# Patient Record
Sex: MALE | Race: WHITE | Employment: FULL TIME | URBAN - METROPOLITAN AREA
[De-identification: names, ages, dates, MRNs, and addresses within clinical notes are randomized per-mention and may not be internally consistent; named-entity substitution may affect disease eponyms.]

---

## 2017-04-01 DIAGNOSIS — K64.5 THROMBOSED EXTERNAL HEMORRHOID: ICD-10-CM

## 2017-04-01 DIAGNOSIS — E78.2 MIXED HYPERLIPIDEMIA: Primary | ICD-10-CM

## 2017-04-01 DIAGNOSIS — B35.3 TINEA PEDIS, UNSPECIFIED LATERALITY: ICD-10-CM

## 2017-04-01 DIAGNOSIS — E03.9 ACQUIRED HYPOTHYROIDISM: ICD-10-CM

## 2017-04-01 RX ORDER — LIDOCAINE HYDROCHLORIDE AND HYDROCORTISONE ACETATE 30; 5 MG/G; MG/G
CREAM RECTAL
Qty: 6 KIT | Refills: 2 | Status: CANCELLED | OUTPATIENT
Start: 2017-04-01

## 2017-04-02 RX ORDER — LEVOTHYROXINE SODIUM 300 UG/1
TABLET ORAL
Qty: 90 TABLET | Refills: 0 | Status: SHIPPED | OUTPATIENT
Start: 2017-04-02 | End: 2017-09-13 | Stop reason: SDUPTHER

## 2017-09-08 DIAGNOSIS — E03.9 ACQUIRED HYPOTHYROIDISM: ICD-10-CM

## 2017-09-08 RX ORDER — LEVOTHYROXINE SODIUM 300 UG/1
TABLET ORAL
Qty: 90 TABLET | Refills: 3 | OUTPATIENT
Start: 2017-09-08

## 2017-09-13 ENCOUNTER — TELEPHONE (OUTPATIENT)
Dept: FAMILY MEDICINE CLINIC | Age: 62
End: 2017-09-13

## 2017-09-13 DIAGNOSIS — E78.2 MIXED HYPERLIPIDEMIA: ICD-10-CM

## 2017-09-13 DIAGNOSIS — E03.9 ACQUIRED HYPOTHYROIDISM: Primary | ICD-10-CM

## 2017-09-14 RX ORDER — LEVOTHYROXINE SODIUM 300 UG/1
TABLET ORAL
Qty: 30 TABLET | Refills: 0 | Status: SHIPPED | OUTPATIENT
Start: 2017-09-14 | End: 2017-10-04 | Stop reason: SDUPTHER

## 2017-09-16 DIAGNOSIS — E03.9 ACQUIRED HYPOTHYROIDISM: ICD-10-CM

## 2017-09-16 DIAGNOSIS — E78.2 MIXED HYPERLIPIDEMIA: ICD-10-CM

## 2017-09-16 LAB
ALBUMIN SERPL-MCNC: 4 G/DL (ref 3.9–4.9)
ALP BLD-CCNC: 78 U/L (ref 35–104)
ALT SERPL-CCNC: 38 U/L (ref 0–41)
ANION GAP SERPL CALCULATED.3IONS-SCNC: 18 MEQ/L (ref 7–13)
AST SERPL-CCNC: 33 U/L (ref 0–40)
BASOPHILS ABSOLUTE: 0 K/UL (ref 0–0.2)
BASOPHILS RELATIVE PERCENT: 0.6 %
BILIRUB SERPL-MCNC: 0.3 MG/DL (ref 0–1.2)
BUN BLDV-MCNC: 16 MG/DL (ref 8–23)
CALCIUM SERPL-MCNC: 9.2 MG/DL (ref 8.6–10.2)
CHLORIDE BLD-SCNC: 104 MEQ/L (ref 98–107)
CHOLESTEROL, TOTAL: 192 MG/DL (ref 0–199)
CO2: 23 MEQ/L (ref 22–29)
CREAT SERPL-MCNC: 0.86 MG/DL (ref 0.7–1.2)
EOSINOPHILS ABSOLUTE: 0.1 K/UL (ref 0–0.7)
EOSINOPHILS RELATIVE PERCENT: 1.7 %
GFR AFRICAN AMERICAN: >60
GFR NON-AFRICAN AMERICAN: >60
GLOBULIN: 2.6 G/DL (ref 2.3–3.5)
GLUCOSE BLD-MCNC: 79 MG/DL (ref 74–109)
HCT VFR BLD CALC: 46.4 % (ref 42–52)
HDLC SERPL-MCNC: 89 MG/DL (ref 40–59)
HEMOGLOBIN: 15.4 G/DL (ref 14–18)
LDL CHOLESTEROL CALCULATED: 86 MG/DL (ref 0–129)
LYMPHOCYTES ABSOLUTE: 2.3 K/UL (ref 1–4.8)
LYMPHOCYTES RELATIVE PERCENT: 34.1 %
MCH RBC QN AUTO: 32.4 PG (ref 27–31.3)
MCHC RBC AUTO-ENTMCNC: 33.2 % (ref 33–37)
MCV RBC AUTO: 97.4 FL (ref 80–100)
MONOCYTES ABSOLUTE: 0.4 K/UL (ref 0.2–0.8)
MONOCYTES RELATIVE PERCENT: 5.8 %
NEUTROPHILS ABSOLUTE: 3.9 K/UL (ref 1.4–6.5)
NEUTROPHILS RELATIVE PERCENT: 57.8 %
PDW BLD-RTO: 13.8 % (ref 11.5–14.5)
PLATELET # BLD: 254 K/UL (ref 130–400)
POTASSIUM SERPL-SCNC: 5.1 MEQ/L (ref 3.5–5.1)
RBC # BLD: 4.76 M/UL (ref 4.7–6.1)
SODIUM BLD-SCNC: 145 MEQ/L (ref 132–144)
T4 FREE: 2.23 NG/DL (ref 0.93–1.7)
TOTAL PROTEIN: 6.6 G/DL (ref 6.4–8.1)
TRIGL SERPL-MCNC: 84 MG/DL (ref 0–200)
TSH SERPL DL<=0.05 MIU/L-ACNC: 0.67 UIU/ML (ref 0.27–4.2)
WBC # BLD: 6.8 K/UL (ref 4.8–10.8)

## 2017-10-04 ENCOUNTER — OFFICE VISIT (OUTPATIENT)
Dept: FAMILY MEDICINE CLINIC | Age: 62
End: 2017-10-04

## 2017-10-04 VITALS
DIASTOLIC BLOOD PRESSURE: 86 MMHG | WEIGHT: 201 LBS | RESPIRATION RATE: 12 BRPM | HEART RATE: 84 BPM | TEMPERATURE: 98.7 F | BODY MASS INDEX: 24.48 KG/M2 | HEIGHT: 76 IN | SYSTOLIC BLOOD PRESSURE: 134 MMHG

## 2017-10-04 DIAGNOSIS — E03.9 ACQUIRED HYPOTHYROIDISM: Primary | ICD-10-CM

## 2017-10-04 DIAGNOSIS — B35.3 TINEA PEDIS, UNSPECIFIED LATERALITY: ICD-10-CM

## 2017-10-04 PROCEDURE — 3017F COLORECTAL CA SCREEN DOC REV: CPT | Performed by: FAMILY MEDICINE

## 2017-10-04 PROCEDURE — G8427 DOCREV CUR MEDS BY ELIG CLIN: HCPCS | Performed by: FAMILY MEDICINE

## 2017-10-04 PROCEDURE — G8420 CALC BMI NORM PARAMETERS: HCPCS | Performed by: FAMILY MEDICINE

## 2017-10-04 PROCEDURE — 4004F PT TOBACCO SCREEN RCVD TLK: CPT | Performed by: FAMILY MEDICINE

## 2017-10-04 PROCEDURE — 99213 OFFICE O/P EST LOW 20 MIN: CPT | Performed by: FAMILY MEDICINE

## 2017-10-04 PROCEDURE — G8484 FLU IMMUNIZE NO ADMIN: HCPCS | Performed by: FAMILY MEDICINE

## 2017-10-04 RX ORDER — LEVOTHYROXINE SODIUM 300 UG/1
300 TABLET ORAL DAILY
Qty: 30 TABLET | Refills: 11 | Status: SHIPPED | OUTPATIENT
Start: 2017-10-04 | End: 2018-11-30 | Stop reason: SDUPTHER

## 2017-10-04 ASSESSMENT — PATIENT HEALTH QUESTIONNAIRE - PHQ9
1. LITTLE INTEREST OR PLEASURE IN DOING THINGS: 0
SUM OF ALL RESPONSES TO PHQ9 QUESTIONS 1 & 2: 0
SUM OF ALL RESPONSES TO PHQ QUESTIONS 1-9: 0
2. FEELING DOWN, DEPRESSED OR HOPELESS: 0

## 2017-10-04 NOTE — PROGRESS NOTES
Chief Complaint   Patient presents with    Hypothyroidism       HPI: Juan A Boyle is a 63 y.o. male presenting for follow-up of hypothyroidism. The patient reports no heat or cold intolerance, good energy levels and no hair loss or excessive skin dryness. Patient had lab work done and is here to further discuss results. Blood test results show improvement and lipid status numbers and thyroid function numbers are at goal.      EXAM:  Constitutional Blood pressure 134/86, pulse 84, temperature 98.7 °F (37.1 °C), temperature source Temporal, resp. rate 12, height 6' 4\" (1.93 m), weight 201 lb (91.2 kg). .   Physical Exam   Constitutional: He is oriented to person, place, and time. He appears well-developed and well-nourished. Neck: Normal range of motion. Cardiovascular: Normal rate, regular rhythm and normal heart sounds. Pulmonary/Chest: Effort normal and breath sounds normal.   Neurological: He is oriented to person, place, and time. DIAGNOSIS:   1. Acquired hypothyroidism  levothyroxine (SYNTHROID) 300 MCG tablet    TSH without Reflex    T4, Free    Well-controlled with current medication, continue. 2. Tinea pedis, unspecified laterality  butenafine (MENTAX) 1 % CREA    Well-controlled with Mentax use, refills written. this patient is interested in having the thyroid looked after. He is not interested in any health protection activities such as colonoscopy, immunizations or other screenings. Plan for follow up: Follow up in 1 year with blood work as ordered. Other follow up as needed.       Electronically signed by Grace Mejia, 10:22 PM 10/4/17

## 2017-10-04 NOTE — PATIENT INSTRUCTIONS
2.6  2.3 - 3.5 g/dL Final    Cholesterol, Total 09/16/2017 192  0 - 199 mg/dL Final    Triglycerides 09/16/2017 84  0 - 200 mg/dL Final    HDL 09/16/2017 89* 40 - 59 mg/dL Final    LDL Calculated 09/16/2017 86  0 - 129 mg/dL Final    T4 Free 09/16/2017 2.23* 0.93 - 1.70 ng/dL Final    TSH 09/16/2017 0.665  0.270 - 4.200 uIU/mL Final

## 2017-10-04 NOTE — MR AVS SNAPSHOT
 MCV 09/16/2017 97.4  80.0 - 100.0 fL Final    MCH 09/16/2017 32.4* 27.0 - 31.3 pg Final    MCHC 09/16/2017 33.2  33.0 - 37.0 % Final    RDW 09/16/2017 13.8  11.5 - 14.5 % Final    Platelets 18/83/0302 254  130 - 400 K/uL Final    Neutrophils % 09/16/2017 57.8  % Final    Lymphocytes % 09/16/2017 34.1  % Final    Monocytes % 09/16/2017 5.8  % Final    Eosinophils % 09/16/2017 1.7  % Final    Basophils % 09/16/2017 0.6  % Final    Neutrophils # 09/16/2017 3.9  1.4 - 6.5 K/uL Final    Lymphocytes # 09/16/2017 2.3  1.0 - 4.8 K/uL Final    Monocytes # 09/16/2017 0.4  0.2 - 0.8 K/uL Final    Eosinophils # 09/16/2017 0.1  0.0 - 0.7 K/uL Final    Basophils # 09/16/2017 0.0  0.0 - 0.2 K/uL Final    Sodium 09/16/2017 145* 132 - 144 mEq/L Final    Potassium 09/16/2017 5.1  3.5 - 5.1 mEq/L Final    Chloride 09/16/2017 104  98 - 107 mEq/L Final    CO2 09/16/2017 23  22 - 29 mEq/L Final    Anion Gap 09/16/2017 18* 7 - 13 mEq/L Final    Glucose 09/16/2017 79  74 - 109 mg/dL Final    BUN 09/16/2017 16  8 - 23 mg/dL Final    CREATININE 09/16/2017 0.86  0.70 - 1.20 mg/dL Final    GFR Non- 09/16/2017 >60.0  >60 Final    GFR  09/16/2017 >60.0  >60 Final    Calcium 09/16/2017 9.2  8.6 - 10.2 mg/dL Final    Total Protein 09/16/2017 6.6  6.4 - 8.1 g/dL Final    Alb 09/16/2017 4.0  3.9 - 4.9 g/dL Final    Total Bilirubin 09/16/2017 0.3  0.0 - 1.2 mg/dL Final    Alkaline Phosphatase 09/16/2017 78  35 - 104 U/L Final    ALT 09/16/2017 38  0 - 41 U/L Final    AST 09/16/2017 33  0 - 40 U/L Final    Globulin 09/16/2017 2.6  2.3 - 3.5 g/dL Final    Cholesterol, Total 09/16/2017 192  0 - 199 mg/dL Final    Triglycerides 09/16/2017 84  0 - 200 mg/dL Final    HDL 09/16/2017 89* 40 - 59 mg/dL Final    LDL Calculated 09/16/2017 86  0 - 129 mg/dL Final    T4 Free 09/16/2017 2.23* 0.93 - 1.70 ng/dL Final    TSH 09/16/2017 0.665  0.270 - 4.200 uIU/mL Final

## 2017-10-18 DIAGNOSIS — B35.3 TINEA PEDIS, UNSPECIFIED LATERALITY: ICD-10-CM

## 2017-10-18 RX ORDER — BUTENAFINE HYDROCHLORIDE 10 MG/G
CREAM TOPICAL
Qty: 45 G | Refills: 0 | OUTPATIENT
Start: 2017-10-18

## 2017-12-17 ENCOUNTER — APPOINTMENT (OUTPATIENT)
Dept: GENERAL RADIOLOGY | Age: 62
End: 2017-12-17
Payer: COMMERCIAL

## 2017-12-17 ENCOUNTER — HOSPITAL ENCOUNTER (EMERGENCY)
Age: 62
Discharge: HOME | End: 2017-12-17
Attending: EMERGENCY MEDICINE
Payer: COMMERCIAL

## 2017-12-17 VITALS
DIASTOLIC BLOOD PRESSURE: 80 MMHG | TEMPERATURE: 98.3 F | SYSTOLIC BLOOD PRESSURE: 173 MMHG | OXYGEN SATURATION: 97 % | RESPIRATION RATE: 16 BRPM | HEIGHT: 76 IN | HEART RATE: 46 BPM | BODY MASS INDEX: 24.96 KG/M2 | WEIGHT: 205 LBS

## 2017-12-17 DIAGNOSIS — M23.91 INTERNAL DERANGEMENT OF RIGHT KNEE: Primary | ICD-10-CM

## 2017-12-17 PROCEDURE — 99283 EMERGENCY DEPT VISIT LOW MDM: CPT

## 2017-12-17 PROCEDURE — 73564 X-RAY EXAM KNEE 4 OR MORE: CPT

## 2017-12-17 ASSESSMENT — PAIN DESCRIPTION - ORIENTATION
ORIENTATION: RIGHT
ORIENTATION: RIGHT

## 2017-12-17 ASSESSMENT — PAIN DESCRIPTION - LOCATION
LOCATION: ABDOMEN;KNEE
LOCATION: KNEE

## 2017-12-17 ASSESSMENT — PAIN DESCRIPTION - FREQUENCY
FREQUENCY: CONTINUOUS
FREQUENCY: CONTINUOUS

## 2017-12-17 ASSESSMENT — PAIN - FUNCTIONAL ASSESSMENT: PAIN_FUNCTIONAL_ASSESSMENT: 0-10

## 2017-12-17 ASSESSMENT — PAIN SCALES - GENERAL
PAINLEVEL_OUTOF10: 4
PAINLEVEL_OUTOF10: 5

## 2017-12-17 ASSESSMENT — PAIN DESCRIPTION - ONSET
ONSET: SUDDEN
ONSET: SUDDEN

## 2017-12-17 ASSESSMENT — PAIN DESCRIPTION - DESCRIPTORS
DESCRIPTORS: BURNING;STABBING
DESCRIPTORS: BURNING;STABBING

## 2017-12-17 ASSESSMENT — PAIN DESCRIPTION - PROGRESSION: CLINICAL_PROGRESSION: GRADUALLY IMPROVING

## 2017-12-17 ASSESSMENT — PAIN DESCRIPTION - PAIN TYPE: TYPE: ACUTE PAIN

## 2017-12-17 NOTE — ED NOTES
Explained discharge instructions and workers comp papers reviewed by Kamala Garcia. Cecilia Goldman over discharge diagnosis and pertinent educational material with patient. Patient stated understanding of discharge diagnosis and  instructions. Patient denies any questions at this time. No signs of distress noted at this time. Patient discharged to home.      Dell Vyas RN  12/17/17 [id number]

## 2017-12-17 NOTE — ED PROVIDER NOTES
 Heart Disease Other           SOCIAL HISTORY       Social History     Social History    Marital status:      Spouse name: N/A    Number of children: N/A    Years of education: N/A     Social History Main Topics    Smoking status: Current Every Day Smoker     Packs/day: 1.00    Smokeless tobacco: Never Used    Alcohol use Yes    Drug use: No    Sexual activity: Not Asked     Other Topics Concern    None     Social History Narrative    None       SCREENINGS           PHYSICAL EXAM    (5+ for level 4, 8+ for level 5)     ED Triage Vitals [12/17/17 0915]   BP Temp Temp Source Pulse Resp SpO2 Height Weight   (!) 194/100 98.3 °F (36.8 °C) Oral 59 18 98 % 6' 4\" (1.93 m) 205 lb (93 kg)       Physical Exam   Constitutional: He is oriented to person, place, and time. He appears well-developed and well-nourished. No distress. HENT:   Head: Normocephalic and atraumatic. Mouth/Throat: Oropharynx is clear and moist.   Eyes: EOM are normal.   Neck: Normal range of motion. Neck supple. Cardiovascular: Normal rate, regular rhythm, normal heart sounds and intact distal pulses. No murmur heard. Pulmonary/Chest: Effort normal and breath sounds normal. No respiratory distress. Abdominal: Soft. Bowel sounds are normal. There is no tenderness. There is no rebound and no guarding. Musculoskeletal: Normal range of motion. He exhibits no edema, tenderness or deformity. Right lower extremity: Negative logroll. Knee extensor mechanism intact. No patellar pain. Negative varus and valgus. No deformities. Mildly positive Kiley's with valgus stress. Distal pulses intact. Skin intact. Neurological: He is alert and oriented to person, place, and time. Skin: Skin is warm and dry. No erythema. Psychiatric: He has a normal mood and affect. Nursing note and vitals reviewed.       DIAGNOSTIC RESULTS     EKG (Per Emergency Physician):       RADIOLOGY (Per Emergency Physician):   Right knee: No fracture or dislocation    Interpretation per the Radiologist below, if available at the time of this note:  No results found. LABS:  Labs Reviewed - No data to display    All other labs were within normal range or not returned as of this dictation. EMERGENCY DEPARTMENT COURSE and DIFFERENTIAL DIAGNOSIS/MDM:   Vitals:    Vitals:    12/17/17 0915 12/17/17 0926   BP: (!) 194/100 (!) 179/86   Pulse: 59 76   Resp: 18 16   Temp: 98.3 °F (36.8 °C)    TempSrc: Oral    SpO2: 98%    Weight: 205 lb (93 kg)    Height: 6' 4\" (1.93 m)        Medications - No data to display    MDM. The patient declines any pain medications. X-ray negative. Ace wrap provided. Rice therapy. Patient states he is off for 2 for 2 days. Did not want any restrictions. Exam concerns for meniscal injury, states inflammatory at this point. Cannot rule out tear currently. Able to ambulate. Discussed monitoring symptoms, following with occupational health if symptoms do not improve. All questions were answered. CONSULTS:  None    PROCEDURES:  Unless otherwise noted below, none     Procedures    FINAL IMPRESSION      1. Internal derangement of right knee          DISPOSITION/PLAN   DISPOSITION Decision to Discharge    PATIENT REFERRED TO:  [address] [facility] [address] [facility] [phone]  In 3 days  If symptoms worsen      DISCHARGE MEDICATIONS:  New Prescriptions    No medications on file          (Please note:  Portions of this note were completed with a voice recognition program.  Efforts were made to edit the dictations but occasionally words and phrases are mis-transcribed.)    Form v2016. J.[id number]    Jordyn Gomes DO (electronically signed)  Emergency Medicine Provider            Jordyn Gomes DO  12/17/17 [id number]

## 2017-12-17 NOTE — ED NOTES
Knee Pain: Patient presents with a knee injury involving the  right knee. Onset of the symptoms was today. Inciting event: injured during a fall while at work. patient complains of lateral pain. . Current symptoms include pain located lateral left knee. Pain is aggravated by any weight bearing. Patient has had no prior knee problems. Evaluation to date: plain films: normal.  Nursing Adult Assessment    General Appearance  [x] Facial Expressions, extremities, & body posture are relaxed. [] Exceptions:    Cognitive  [x] Alert, make eye contact when prompted. [x] Oriented to person, place, & situation. [] Exceptions:    Respiratory  [x] Unlabored breathing   [x] Speaks in clear and complete sentences   [x] Chest expansion is symmetrical with breaths   [] Breath sounds are clear bilaterally. [] Exceptions:    Cardiovascular  [] Regular apical heart sounds   [] Peripheral pulses are palpable   [x] Capillary refill < 3 seconds in all extremities. [] Exceptions:    Abdomen  [] Non-tender   [] Non-distended   [] Bowel sounds x4 quadrants.  [] Exceptions:    Skin  [x] Color appropriate for ethnicity   [] No rash or discoloration present at the area(s) of complaint   [x] Warm and dry to touch. [] Exceptions:    Extremities  [] Non-tender   [x] Normal range of motion   [x] Normal sensation   [x] Normal Appearance, No Edema. [] Exceptions: right lateral knee pain.       Fern Orellana RN  12/17/17 1024

## 2017-12-17 NOTE — ED NOTES
Ice pack and ace wrap applied to Right knee. Discussed with patient homegoing instructions.       Adriel Adams RN  12/17/17 8283

## 2018-09-07 ENCOUNTER — HOSPITAL ENCOUNTER (OUTPATIENT)
Dept: GENERAL RADIOLOGY | Age: 63
Discharge: HOME | End: 2018-09-07

## 2018-09-07 DIAGNOSIS — Z00.00 ANNUAL PHYSICAL EXAM: ICD-10-CM

## 2018-11-30 DIAGNOSIS — E03.9 ACQUIRED HYPOTHYROIDISM: ICD-10-CM

## 2018-11-30 RX ORDER — LEVOTHYROXINE SODIUM 300 UG/1
300 TABLET ORAL DAILY
Qty: 30 TABLET | Refills: 1 | Status: SHIPPED | OUTPATIENT
Start: 2018-11-30 | End: 2019-01-21 | Stop reason: SDUPTHER

## 2018-11-30 NOTE — TELEPHONE ENCOUNTER
patient requesting refill. Please approve or deny this refill request. The order is pended. Thank you. LOV 10/2017    Patient has been made aware he needs to schedule appt. States he needs to check his schedule and call back    Next Visit Date:  No future appointments.

## 2019-01-21 ENCOUNTER — OFFICE VISIT (OUTPATIENT)
Dept: FAMILY MEDICINE CLINIC | Age: 64
End: 2019-01-21

## 2019-01-21 VITALS
SYSTOLIC BLOOD PRESSURE: 174 MMHG | HEART RATE: 97 BPM | RESPIRATION RATE: 14 BRPM | OXYGEN SATURATION: 94 % | TEMPERATURE: 98.5 F | WEIGHT: 215.2 LBS | HEIGHT: 76 IN | BODY MASS INDEX: 26.2 KG/M2 | DIASTOLIC BLOOD PRESSURE: 94 MMHG

## 2019-01-21 DIAGNOSIS — E03.9 ACQUIRED HYPOTHYROIDISM: ICD-10-CM

## 2019-01-21 PROCEDURE — 3017F COLORECTAL CA SCREEN DOC REV: CPT | Performed by: FAMILY MEDICINE

## 2019-01-21 PROCEDURE — G8427 DOCREV CUR MEDS BY ELIG CLIN: HCPCS | Performed by: FAMILY MEDICINE

## 2019-01-21 PROCEDURE — 4004F PT TOBACCO SCREEN RCVD TLK: CPT | Performed by: FAMILY MEDICINE

## 2019-01-21 PROCEDURE — G8484 FLU IMMUNIZE NO ADMIN: HCPCS | Performed by: FAMILY MEDICINE

## 2019-01-21 PROCEDURE — 99213 OFFICE O/P EST LOW 20 MIN: CPT | Performed by: FAMILY MEDICINE

## 2019-01-21 PROCEDURE — G8419 CALC BMI OUT NRM PARAM NOF/U: HCPCS | Performed by: FAMILY MEDICINE

## 2019-01-21 RX ORDER — LEVOTHYROXINE SODIUM 300 UG/1
300 TABLET ORAL DAILY
Qty: 30 TABLET | Refills: 11 | Status: SHIPPED | OUTPATIENT
Start: 2019-01-21

## 2019-01-21 ASSESSMENT — PATIENT HEALTH QUESTIONNAIRE - PHQ9
2. FEELING DOWN, DEPRESSED OR HOPELESS: 0
SUM OF ALL RESPONSES TO PHQ9 QUESTIONS 1 & 2: 0
SUM OF ALL RESPONSES TO PHQ QUESTIONS 1-9: 0
SUM OF ALL RESPONSES TO PHQ QUESTIONS 1-9: 0
1. LITTLE INTEREST OR PLEASURE IN DOING THINGS: 0

## 2019-03-18 ENCOUNTER — TELEPHONE (OUTPATIENT)
Dept: INTERNAL MEDICINE CLINIC | Age: 64
End: 2019-03-18

## 2023-10-03 ENCOUNTER — TELEPHONE (OUTPATIENT)
Dept: CARDIOLOGY | Facility: CLINIC | Age: 68
End: 2023-10-03
Payer: MEDICARE

## 2023-10-03 NOTE — TELEPHONE ENCOUNTER
Patient left a message stating he is s/p cardioversion. Wanted to verify heart is currently in NSR. Left message returning his call.

## 2023-10-10 NOTE — TELEPHONE ENCOUNTER
Phoned patient states his message was inbetween OV visits. States he is in NSR, according to recent EKG, has scheduled follow up with  Dr. Gavino Diaz MD on 10/23/23 s/p [facility].

## 2023-10-20 PROBLEM — J84.10 PULMONARY FIBROSIS (MULTI): Status: ACTIVE | Noted: 2023-08-09

## 2023-10-20 PROBLEM — R06.02 SOB (SHORTNESS OF BREATH): Status: ACTIVE | Noted: 2023-10-20

## 2023-10-20 PROBLEM — E78.5 HYPERLIPIDEMIA: Status: ACTIVE | Noted: 2023-10-20

## 2023-10-20 PROBLEM — I48.0 PAROXYSMAL ATRIAL FIBRILLATION (MULTI): Status: ACTIVE | Noted: 2020-11-25

## 2023-10-20 PROBLEM — J30.9 ALLERGIC RHINITIS: Status: ACTIVE | Noted: 2020-06-11

## 2023-10-20 PROBLEM — E78.00 PURE HYPERCHOLESTEROLEMIA: Status: ACTIVE | Noted: 2023-07-12

## 2023-10-20 PROBLEM — K75.81 NASH (NONALCOHOLIC STEATOHEPATITIS): Status: ACTIVE | Noted: 2023-07-12

## 2023-10-20 PROBLEM — E03.9 ACQUIRED HYPOTHYROIDISM: Status: ACTIVE | Noted: 2019-06-09

## 2023-10-20 PROBLEM — F10.20 ALCOHOLISM (MULTI): Status: ACTIVE | Noted: 2023-07-12

## 2023-10-20 PROBLEM — F17.210 CIGARETTE NICOTINE DEPENDENCE WITHOUT COMPLICATION: Status: ACTIVE | Noted: 2023-07-28

## 2023-10-20 RX ORDER — ASPIRIN 81 MG/1
81 TABLET ORAL 2 TIMES DAILY
COMMUNITY
Start: 2020-01-16 | End: 2023-10-23 | Stop reason: ALTCHOICE

## 2023-10-20 RX ORDER — LEVOTHYROXINE SODIUM 150 UG/1
150 TABLET ORAL
COMMUNITY
End: 2023-10-23 | Stop reason: ALTCHOICE

## 2023-10-20 RX ORDER — AMIODARONE HYDROCHLORIDE 200 MG/1
200 TABLET ORAL 2 TIMES DAILY
COMMUNITY
Start: 2023-08-09 | End: 2023-10-23 | Stop reason: SDUPTHER

## 2023-10-20 RX ORDER — METOPROLOL SUCCINATE 100 MG/1
100 TABLET, EXTENDED RELEASE ORAL DAILY
COMMUNITY
End: 2023-10-23 | Stop reason: SDUPTHER

## 2023-10-20 RX ORDER — ACETAMINOPHEN 500 MG/1
500 CAPSULE, LIQUID FILLED ORAL DAILY PRN
COMMUNITY

## 2023-10-20 RX ORDER — ONDANSETRON 4 MG/1
1 TABLET, FILM COATED ORAL
COMMUNITY
Start: 2023-08-19

## 2023-10-20 RX ORDER — BUMETANIDE 1 MG/1
1 TABLET ORAL DAILY
COMMUNITY
Start: 2023-08-09 | End: 2023-10-23 | Stop reason: SDUPTHER

## 2023-10-20 RX ORDER — LEVOTHYROXINE SODIUM 25 UG/1
25 TABLET ORAL
COMMUNITY
Start: 2023-05-03 | End: 2023-10-23 | Stop reason: ALTCHOICE

## 2023-10-20 RX ORDER — LEVOTHYROXINE SODIUM 100 UG/1
100 TABLET ORAL
COMMUNITY
Start: 2023-09-08 | End: 2023-12-19 | Stop reason: DRUGHIGH

## 2023-10-20 RX ORDER — FOLIC ACID 1 MG/1
1 TABLET ORAL DAILY
COMMUNITY
Start: 2023-01-19 | End: 2023-10-23 | Stop reason: ALTCHOICE

## 2023-10-20 RX ORDER — LEVOTHYROXINE SODIUM 75 UG/1
75 TABLET ORAL
COMMUNITY
Start: 2023-07-11 | End: 2023-10-23 | Stop reason: ALTCHOICE

## 2023-10-20 RX ORDER — OMEPRAZOLE 40 MG/1
40 CAPSULE, DELAYED RELEASE ORAL DAILY
COMMUNITY
Start: 2023-09-08

## 2023-10-20 RX ORDER — LANOLIN ALCOHOL/MO/W.PET/CERES
400 CREAM (GRAM) TOPICAL DAILY
COMMUNITY
Start: 2023-06-14 | End: 2023-10-23 | Stop reason: ALTCHOICE

## 2023-10-20 RX ORDER — POTASSIUM CHLORIDE 750 MG/1
10 TABLET, EXTENDED RELEASE ORAL 2 TIMES DAILY
COMMUNITY
Start: 2023-09-14

## 2023-10-20 RX ORDER — RIVAROXABAN 20 MG/1
20 TABLET, FILM COATED ORAL DAILY
COMMUNITY
Start: 2023-09-08 | End: 2023-10-23 | Stop reason: ALTCHOICE

## 2023-10-23 ENCOUNTER — OFFICE VISIT (OUTPATIENT)
Dept: CARDIOLOGY | Facility: CLINIC | Age: 68
End: 2023-10-23
Payer: MEDICARE

## 2023-10-23 VITALS
WEIGHT: 200 LBS | DIASTOLIC BLOOD PRESSURE: 84 MMHG | BODY MASS INDEX: 24.36 KG/M2 | SYSTOLIC BLOOD PRESSURE: 122 MMHG | HEART RATE: 60 BPM | HEIGHT: 76 IN

## 2023-10-23 DIAGNOSIS — E78.2 MIXED HYPERLIPIDEMIA: ICD-10-CM

## 2023-10-23 DIAGNOSIS — R06.02 SOB (SHORTNESS OF BREATH): ICD-10-CM

## 2023-10-23 DIAGNOSIS — I48.0 PAROXYSMAL ATRIAL FIBRILLATION (MULTI): ICD-10-CM

## 2023-10-23 PROBLEM — J84.10 PULMONARY FIBROSIS (MULTI): Status: RESOLVED | Noted: 2023-08-09 | Resolved: 2023-10-23

## 2023-10-23 PROBLEM — E78.00 PURE HYPERCHOLESTEROLEMIA: Status: RESOLVED | Noted: 2023-07-12 | Resolved: 2023-10-23

## 2023-10-23 PROCEDURE — 1159F MED LIST DOCD IN RCRD: CPT | Performed by: INTERNAL MEDICINE

## 2023-10-23 PROCEDURE — 4004F PT TOBACCO SCREEN RCVD TLK: CPT | Performed by: INTERNAL MEDICINE

## 2023-10-23 PROCEDURE — 99214 OFFICE O/P EST MOD 30 MIN: CPT | Performed by: INTERNAL MEDICINE

## 2023-10-23 PROCEDURE — 93000 ELECTROCARDIOGRAM COMPLETE: CPT | Performed by: INTERNAL MEDICINE

## 2023-10-23 RX ORDER — BUMETANIDE 1 MG/1
1 TABLET ORAL DAILY
Qty: 90 TABLET | Refills: 3 | Status: SHIPPED | OUTPATIENT
Start: 2023-10-23 | End: 2024-10-22

## 2023-10-23 RX ORDER — AMIODARONE HYDROCHLORIDE 200 MG/1
200 TABLET ORAL 2 TIMES DAILY
Qty: 180 TABLET | Refills: 3 | Status: SHIPPED | OUTPATIENT
Start: 2023-10-23 | End: 2024-10-22

## 2023-10-23 RX ORDER — ASPIRIN 81 MG/1
81 TABLET ORAL
Qty: 12 TABLET | Refills: 3 | Status: SHIPPED | OUTPATIENT
Start: 2023-10-23 | End: 2024-10-22

## 2023-10-23 RX ORDER — METOPROLOL SUCCINATE 100 MG/1
100 TABLET, EXTENDED RELEASE ORAL DAILY
Qty: 90 TABLET | Refills: 3 | Status: SHIPPED | OUTPATIENT
Start: 2023-10-23 | End: 2024-10-22

## 2023-10-23 ASSESSMENT — ENCOUNTER SYMPTOMS
IRREGULAR HEARTBEAT: 1
PALPITATIONS: 1
DYSPNEA ON EXERTION: 1
SHORTNESS OF BREATH: 1

## 2023-10-23 NOTE — PROGRESS NOTES
"Subjective   Aníbal Gómez is a 68 y.o. male       Chief Complaint    Follow-up          HPI     Last 2 weeks feels better after restoring and maintaining sinus rhythm with amiodarone.  The reason and the rationale for this intervention and plan of care were discussed in detail and he subscribes to it because he now notices some improvement.    The importance of surveillance for amiodarone toxicity including testing which includes lab, x-ray and PFTs was explained that he will comply with the regimen.  Results will be reviewed when they become available.    He was educated regarding arrhythmia symptoms to watch for and encouraged to call and/or seek care if they arise or occur.  He questions whether Xarelto is mandatory and because his stroke risk is low I believe it could be stopped and he could continue aspirin therapy.  He understands that if atrial fibrillation were to recur he should reimplement Xarelto therapy.    He is going to [place] for several months.  He was encouraged to call if he has questions concerns or symptoms but obviously it is impossible for us to treat him over the telephone under many circumstances and this was explained and he agrees.    Review of Systems   Constitutional: Positive for malaise/fatigue.   Cardiovascular:  Positive for dyspnea on exertion, irregular heartbeat and palpitations.   Respiratory:  Positive for shortness of breath.    All other systems reviewed and are negative.         Visit Vitals  /84 (BP Location: Right arm, Patient Position: Sitting)   Pulse 60   Ht 1.93 m (6' 4\")   Wt 90.7 kg (200 lb)   BMI 24.34 kg/m²   Smoking Status Every Day   BSA 2.21 m²     EKG done in office today      Objective   Physical Exam    Current Medications    Current Outpatient Medications:     acetaminophen (Tylenol) 500 mg capsule, Take 1 capsule (500 mg) by mouth once daily as needed., Disp: , Rfl:     amiodarone (Pacerone) 200 mg tablet, Take 1 tablet (200 mg) by mouth 2 times a " day., Disp: , Rfl:     bumetanide (Bumex) 1 mg tablet, Take 1 tablet (1 mg) by mouth once daily., Disp: , Rfl:     levothyroxine (Synthroid, Levoxyl) 100 mcg tablet, Take 1 tablet (100 mcg) by mouth once daily in the morning. Take before meals., Disp: , Rfl:     metoprolol succinate XL (Toprol-XL) 100 mg 24 hr tablet, Take 1 tablet (100 mg) by mouth once daily., Disp: , Rfl:     omeprazole (PriLOSEC) 40 mg DR capsule, Take 1 capsule (40 mg) by mouth once daily., Disp: , Rfl:     ondansetron (Zofran) 4 mg tablet, Take 1 tablet (4 mg) by mouth., Disp: , Rfl:     potassium chloride CR 10 mEq ER tablet, Take 1 tablet (10 mEq) by mouth 2 times a day., Disp: , Rfl:     Xarelto 20 mg tablet, Take 1 tablet (20 mg) by mouth once daily., Disp: , Rfl:                      Assessment/Plan   No diagnosis found. 1. Paroxysmal atrial fibrillation (CMS/HCC)  Restored to sinus rhythm.  Improved functional status with maintenance of sinus rhythm.  - aspirin 81 mg EC tablet; Take 1 tablet (81 mg) by mouth 1 (one) time per week.  Dispense: 12 tablet; Refill: 3  - ECG 12 Lead  - Follow Up In Cardiology; Future  - amiodarone (Pacerone) 200 mg tablet; Take 1 tablet (200 mg) by mouth 2 times a day.  Dispense: 180 tablet; Refill: 3  - metoprolol succinate XL (Toprol-XL) 100 mg 24 hr tablet; Take 1 tablet (100 mg) by mouth once daily.  Dispense: 90 tablet; Refill: 3    2. Mixed hyperlipidemia  Currently well controlled.  No adjustments in therapy necessary  - aspirin 81 mg EC tablet; Take 1 tablet (81 mg) by mouth 1 (one) time per week.  Dispense: 12 tablet; Refill: 3  - ECG 12 Lead  - Follow Up In Cardiology; Future    3. SOB (shortness of breath)  Patient had been experiencing a lot of shortness of breath but notes over the last several weeks it is gotten better.  We believe that maintenance of sinus rhythm is helpful.  - bumetanide (Bumex) 1 mg tablet; Take 1 tablet (1 mg) by mouth once daily.  Dispense: 90 tablet; Refill: 3

## 2023-10-23 NOTE — PATIENT INSTRUCTIONS
Please bring all medicines, vitamins, and herbal supplements with you when you come to the office.    Prescriptions will not be filled unless you are compliant with your follow up appointments or have a follow up appointment scheduled as per instruction of your physician. Refills should be requested at the time of your visit.    Amiodarone follow up per routine

## 2023-12-08 ENCOUNTER — TELEPHONE (OUTPATIENT)
Dept: CARDIOLOGY | Facility: CLINIC | Age: 68
End: 2023-12-08
Payer: MEDICARE

## 2023-12-08 DIAGNOSIS — Z79.899 HIGH RISK MEDICATION USE: ICD-10-CM

## 2023-12-08 DIAGNOSIS — I48.0 PAROXYSMAL ATRIAL FIBRILLATION (MULTI): ICD-10-CM

## 2023-12-08 NOTE — TELEPHONE ENCOUNTER
Amiodarone testing orders sent to [facility] due ASAP. baselines.  PFT paper order also completed.

## 2023-12-18 LAB
NON-UH HIE ANION GAP: 12.2 (ref 6–15)
NON-UH HIE ASPARTATE AMINO TRANSFERASE: 16 U/L (ref 13–39)
NON-UH HIE BLOOD UREA NITROGEN: 16 MG/DL (ref 7–25)
NON-UH HIE CALCIUM: 9.7 MG/DL (ref 8.6–10.3)
NON-UH HIE CARBON DIOXIDE: 29.1 MMOL/L (ref 21–31)
NON-UH HIE CHLORIDE: 105 MMOL/L (ref 98–107)
NON-UH HIE CREATININE: 1.08 MG/DL (ref 0.7–1.3)
NON-UH HIE ESTIMATED GFR: > 60
NON-UH HIE GLUCOSE: 94 MG/DL (ref 70–100)
NON-UH HIE POTASSIUM: 4.3 MMOL/L (ref 3.5–5.1)
NON-UH HIE SODIUM: 142 MMOL/L (ref 136–145)
NON-UH HIE THYROID STIMULATING HORMONE: 20.99 U[IU]/ML (ref 0.45–5.33)

## 2023-12-19 DIAGNOSIS — I48.0 PAROXYSMAL ATRIAL FIBRILLATION (MULTI): ICD-10-CM

## 2023-12-19 DIAGNOSIS — E03.9 ACQUIRED HYPOTHYROIDISM: ICD-10-CM

## 2023-12-19 RX ORDER — LEVOTHYROXINE SODIUM 200 UG/1
200 TABLET ORAL
Qty: 90 TABLET | Refills: 1 | Status: SHIPPED | OUTPATIENT
Start: 2023-12-19 | End: 2024-12-18

## 2023-12-19 NOTE — TELEPHONE ENCOUNTER
Result Communication    Resulted Orders   [facility] Basic Metabolic Panel   Result Value Ref Range    [facility] Glucose 94 70 - 100 mg/dL      Comment:         Random Glucose Reference Range is dependent on time and   content of last meal. Glucose of more than 200 mg/dL in a   nonstressed, ambulatory subject supports the diagnosis of   Diabetes Mellitus.   ADA recommended reference range    [facility] Blood Urea Nitrogen 16 7 - 25 mg/dL    [facility] Creatinine 1.08 0.70 - 1.30 mg/dL    [facility] ESTIMATED GFR > 60.0     [facility] Sodium 142 136 - 145 mmol/L    [facility] Potassium 4.3 3.5 - 5.1 mmol/L    [facility] Chloride 105 98 - 107 mmol/L    [facility] Carbon Dioxide 29.1 21.0 - 31.0 mmol/L    [facility] Anion Gap 12.2 6.0 - 15.0    [facility] Calcium 9.7 8.6 - 10.3 mg/dL   [facility] Aspartate Amino Transferase   Result Value Ref Range    [facility] Aspartate Amino Transferase 16 13 - 39 U/L   [facility] Thyroid Stimulating Hormone   Result Value Ref Range    [facility] Thyroid Stimulating Hormone 20.99 (H) 0.45 - 5.33 u[iU]/mL      Comment:      PERFORMED BY:[facility] [place], [place] [id number] MEDICAL DIRECTORLUZ PRATT M.D.       10:29 AM      Results were successfully communicated with the patient and they acknowledged their understanding.

## 2023-12-19 NOTE — TELEPHONE ENCOUNTER
----- Message from Gavino Diaz MD sent at 12/19/2023 10:07 AM EST -----  Double levothyroxine dosage  ----- Message -----  From: Phoebe Zendejas - Lab Results In  Sent: 12/18/2023   1:31 PM EST  To: Gavino Diaz MD

## 2024-05-28 ENCOUNTER — APPOINTMENT (OUTPATIENT)
Dept: CARDIOLOGY | Facility: CLINIC | Age: 69
End: 2024-05-28
Payer: MEDICARE

## 2024-06-17 ENCOUNTER — APPOINTMENT (OUTPATIENT)
Dept: CARDIOLOGY | Facility: CLINIC | Age: 69
End: 2024-06-17
Payer: MEDICARE

## 2024-06-17 VITALS
HEART RATE: 78 BPM | BODY MASS INDEX: 23.62 KG/M2 | HEIGHT: 76 IN | WEIGHT: 194 LBS | DIASTOLIC BLOOD PRESSURE: 86 MMHG | SYSTOLIC BLOOD PRESSURE: 142 MMHG

## 2024-06-17 DIAGNOSIS — E78.2 MIXED HYPERLIPIDEMIA: ICD-10-CM

## 2024-06-17 DIAGNOSIS — I48.0 PAROXYSMAL ATRIAL FIBRILLATION (MULTI): ICD-10-CM

## 2024-06-17 PROCEDURE — 93000 ELECTROCARDIOGRAM COMPLETE: CPT | Performed by: INTERNAL MEDICINE

## 2024-06-17 PROCEDURE — 99213 OFFICE O/P EST LOW 20 MIN: CPT | Performed by: INTERNAL MEDICINE

## 2024-06-17 PROCEDURE — 1159F MED LIST DOCD IN RCRD: CPT | Performed by: INTERNAL MEDICINE

## 2024-06-17 NOTE — PROGRESS NOTES
"Subjective   Aníbal Gómez is a 68 y.o. male       Chief Complaint    Follow-up          HPI     Review of Systems   All other systems reviewed and are negative.     Patient returns in follow-up of problems as noted.  In the interim he has done well.  Sinus rhythm is maintained.  He stopped taking amiodarone sometime ago and we were unaware of it.  He does not wish to go back on it.  I reminded him that in the past he had atrial fibrillation not controlled with sotalol and ultimately he was placed on amiodarone to effectively restore maintain sinus rhythm in order for him to be recertified for 's license.  I suggested to him that recurrence of the arrhythmia would be problematic and probably threaten his 's license and he states that he understands but does not wish to go back on the medication.  We spent a significant amount of time teaching him on how to monitor rhythm by checking his pulse and using his blood pressure machine on a daily basis for rhythm surveillance.  He states he will do so and contact us if he has any problems with irregular rhythm.  Otherwise it appears he is doing relatively well.  We also note he is no longer on statin therapy.  Treatment for hyperlipidemia was discussed but he wishes to forego this as well.  He states he understands the consequences.    Because of all the above we will continue his treatment as is.  He has been encouraged to seek care in a prompt and timely fashion if he believes he has developed an irregularity of rhythm.      Vitals:    06/17/24 0921   BP: 142/86   BP Location: Left arm   Patient Position: Sitting   Pulse: 78   Weight: 88 kg (194 lb)   Height: 1.93 m (6' 4\")        EKG done in office today      Objective   Physical Exam  Constitutional:       Appearance: Normal appearance.   HENT:      Nose: Nose normal.   Neck:      Vascular: No carotid bruit.   Cardiovascular:      Rate and Rhythm: Normal rate.      Pulses: Normal " pulses.      Heart sounds: Normal heart sounds.   Pulmonary:      Effort: Pulmonary effort is normal.   Abdominal:      General: Bowel sounds are normal.      Palpations: Abdomen is soft.   Musculoskeletal:         General: Normal range of motion.      Cervical back: Normal range of motion.      Right lower leg: No edema.      Left lower leg: No edema.   Skin:     General: Skin is warm and dry.   Neurological:      General: No focal deficit present.      Mental Status: He is alert.   Psychiatric:         Mood and Affect: Mood normal.         Behavior: Behavior normal.         Thought Content: Thought content normal.         Judgment: Judgment normal.         Allergies  Patient has no known allergies.     Current Medications    Current Outpatient Medications:     acetaminophen (Tylenol) 500 mg capsule, Take 1 capsule (500 mg) by mouth once daily as needed., Disp: , Rfl:     levothyroxine (Synthroid) 200 mcg tablet, Take 1 tablet (200 mcg) by mouth once daily in the morning. Take before meals., Disp: 90 tablet, Rfl: 1    potassium chloride CR 10 mEq ER tablet, Take 1 tablet (10 mEq) by mouth 2 times a day., Disp: , Rfl:     amiodarone (Pacerone) 200 mg tablet, Take 1 tablet (200 mg) by mouth 2 times a day., Disp: 180 tablet, Rfl: 3    aspirin 81 mg EC tablet, Take 1 tablet (81 mg) by mouth 1 (one) time per week., Disp: 12 tablet, Rfl: 3    bumetanide (Bumex) 1 mg tablet, Take 1 tablet (1 mg) by mouth once daily., Disp: 90 tablet, Rfl: 3    metoprolol succinate XL (Toprol-XL) 100 mg 24 hr tablet, Take 1 tablet (100 mg) by mouth once daily., Disp: 90 tablet, Rfl: 3           Assessment/Plan     1. Paroxysmal atrial fibrillation (Multi)  Sinus rhythm is maintained off amiodarone.  Continue same, at patient preference.  - Follow Up In Cardiology    2. Mixed hyperlipidemia  Patient wishes to forego statin treatment.  The negative consequences of untreated hyperlipidemia were discussed.  - Follow Up In Cardiology        Scribe Attestation  By signing my name below, I, Deepak Berrios LPN   attest that this documentation has been prepared under the direction and in the presence of Gavino Diaz MD.     Provider Attestation - Scribe documentation    All medical record entries made by the Scribe were at my direction and personally dictated by me. I have reviewed the chart and agree that the record accurately reflects my personal performance of the history, physical exam, discussion and plan.

## 2024-06-17 NOTE — LETTER
June 17, 2024     Chris Dubois MD  [address]  [facility] [address]    Patient: Aníbal Gómez   YOB: 1955   Date of Visit: 6/17/2024       Dear Dr. Chris Dubois MD:    Thank you for referring Aníbal Gómez to me for evaluation. Below are my notes for this consultation.  If you have questions, please do not hesitate to call me. I look forward to following your patient along with you.       Sincerely,     Gavino Diaz MD      CC: No Recipients  ______________________________________________________________________________________    Subjective   Aníbal Gómez is a 68 y.o. male       Chief Complaint    Follow-up          HPI     Review of Systems   All other systems reviewed and are negative.     Patient returns in follow-up of problems as noted.  In the interim he has done well.  Sinus rhythm is maintained.  He stopped taking amiodarone sometime ago and we were unaware of it.  He does not wish to go back on it.  I reminded him that in the past he had atrial fibrillation not controlled with sotalol and ultimately he was placed on amiodarone to effectively restore maintain sinus rhythm in order for him to be recertified for 's license.  I suggested to him that recurrence of the arrhythmia would be problematic and probably threaten his 's license and he states that he understands but does not wish to go back on the medication.  We spent a significant amount of time teaching him on how to monitor rhythm by checking his pulse and using his blood pressure machine on a daily basis for rhythm surveillance.  He states he will do so and contact us if he has any problems with irregular rhythm.  Otherwise it appears he is doing relatively well.  We also note he is no longer on statin therapy.  Treatment for hyperlipidemia was discussed but he wishes to forego this as well.  He states he understands the consequences.    Because of all the above we  "will continue his treatment as is.  He has been encouraged to seek care in a prompt and timely fashion if he believes he has developed an irregularity of rhythm.      Vitals:    06/17/24 0921   BP: 142/86   BP Location: Left arm   Patient Position: Sitting   Pulse: 78   Weight: 88 kg (194 lb)   Height: 1.93 m (6' 4\")        EKG done in office today      Objective   Physical Exam  Constitutional:       Appearance: Normal appearance.   HENT:      Nose: Nose normal.   Neck:      Vascular: No carotid bruit.   Cardiovascular:      Rate and Rhythm: Normal rate.      Pulses: Normal pulses.      Heart sounds: Normal heart sounds.   Pulmonary:      Effort: Pulmonary effort is normal.   Abdominal:      General: Bowel sounds are normal.      Palpations: Abdomen is soft.   Musculoskeletal:         General: Normal range of motion.      Cervical back: Normal range of motion.      Right lower leg: No edema.      Left lower leg: No edema.   Skin:     General: Skin is warm and dry.   Neurological:      General: No focal deficit present.      Mental Status: He is alert.   Psychiatric:         Mood and Affect: Mood normal.         Behavior: Behavior normal.         Thought Content: Thought content normal.         Judgment: Judgment normal.         Allergies  Patient has no known allergies.     Current Medications    Current Outpatient Medications:   •  acetaminophen (Tylenol) 500 mg capsule, Take 1 capsule (500 mg) by mouth once daily as needed., Disp: , Rfl:   •  levothyroxine (Synthroid) 200 mcg tablet, Take 1 tablet (200 mcg) by mouth once daily in the morning. Take before meals., Disp: 90 tablet, Rfl: 1  •  potassium chloride CR 10 mEq ER tablet, Take 1 tablet (10 mEq) by mouth 2 times a day., Disp: , Rfl:   •  amiodarone (Pacerone) 200 mg tablet, Take 1 tablet (200 mg) by mouth 2 times a day., Disp: 180 tablet, Rfl: 3  •  aspirin 81 mg EC tablet, Take 1 tablet (81 mg) by mouth 1 (one) time per week., Disp: 12 tablet, Rfl: 3  •  " bumetanide (Bumex) 1 mg tablet, Take 1 tablet (1 mg) by mouth once daily., Disp: 90 tablet, Rfl: 3  •  metoprolol succinate XL (Toprol-XL) 100 mg 24 hr tablet, Take 1 tablet (100 mg) by mouth once daily., Disp: 90 tablet, Rfl: 3           Assessment/Plan     1. Paroxysmal atrial fibrillation (Multi)  Sinus rhythm is maintained off amiodarone.  Continue same, at patient preference.  - Follow Up In Cardiology    2. Mixed hyperlipidemia  Patient wishes to forego statin treatment.  The negative consequences of untreated hyperlipidemia were discussed.  - Follow Up In Cardiology       Scribe Attestation  By signing my name below, ILorelei LPN   , Scribe   attest that this documentation has been prepared under the direction and in the presence of Gavino Diaz MD.     Provider Attestation - Scribe documentation    All medical record entries made by the Scribe were at my direction and personally dictated by me. I have reviewed the chart and agree that the record accurately reflects my personal performance of the history, physical exam, discussion and plan.

## 2025-04-29 ENCOUNTER — APPOINTMENT (OUTPATIENT)
Dept: CARDIOLOGY | Facility: CLINIC | Age: 70
End: 2025-04-29
Payer: MEDICARE

## 2025-06-19 ENCOUNTER — APPOINTMENT (OUTPATIENT)
Dept: CARDIOLOGY | Facility: CLINIC | Age: 70
End: 2025-06-19
Payer: MEDICARE

## 2025-06-19 VITALS
DIASTOLIC BLOOD PRESSURE: 86 MMHG | SYSTOLIC BLOOD PRESSURE: 144 MMHG | HEIGHT: 76 IN | BODY MASS INDEX: 25.09 KG/M2 | WEIGHT: 206 LBS | HEART RATE: 99 BPM

## 2025-06-19 DIAGNOSIS — Z90.89 HISTORY OF THYROIDECTOMY: ICD-10-CM

## 2025-06-19 DIAGNOSIS — Z98.890 HISTORY OF SHOULDER SURGERY: ICD-10-CM

## 2025-06-19 DIAGNOSIS — F10.20 ALCOHOLISM (MULTI): ICD-10-CM

## 2025-06-19 DIAGNOSIS — Z98.890 HISTORY OF THYROIDECTOMY: ICD-10-CM

## 2025-06-19 DIAGNOSIS — Z82.49 FAMILY HISTORY OF CORONARY ARTERY DISEASE: ICD-10-CM

## 2025-06-19 DIAGNOSIS — R06.02 SOB (SHORTNESS OF BREATH): ICD-10-CM

## 2025-06-19 DIAGNOSIS — E78.2 MIXED HYPERLIPIDEMIA: ICD-10-CM

## 2025-06-19 DIAGNOSIS — I48.0 PAROXYSMAL ATRIAL FIBRILLATION (MULTI): ICD-10-CM

## 2025-06-19 DIAGNOSIS — F17.210 CIGARETTE NICOTINE DEPENDENCE WITHOUT COMPLICATION: ICD-10-CM

## 2025-06-19 DIAGNOSIS — R60.0 LOCALIZED EDEMA: ICD-10-CM

## 2025-06-19 DIAGNOSIS — R94.31 ABNORMAL ELECTROCARDIOGRAM (ECG) (EKG): ICD-10-CM

## 2025-06-19 DIAGNOSIS — E03.9 ACQUIRED HYPOTHYROIDISM: ICD-10-CM

## 2025-06-19 DIAGNOSIS — I10 ESSENTIAL (PRIMARY) HYPERTENSION: ICD-10-CM

## 2025-06-19 DIAGNOSIS — Z98.49 HISTORY OF CATARACT EXTRACTION, UNSPECIFIED LATERALITY: ICD-10-CM

## 2025-06-19 DIAGNOSIS — R06.2 WHEEZES: ICD-10-CM

## 2025-06-19 PROCEDURE — 3077F SYST BP >= 140 MM HG: CPT | Performed by: INTERNAL MEDICINE

## 2025-06-19 PROCEDURE — 1159F MED LIST DOCD IN RCRD: CPT | Performed by: INTERNAL MEDICINE

## 2025-06-19 PROCEDURE — 3079F DIAST BP 80-89 MM HG: CPT | Performed by: INTERNAL MEDICINE

## 2025-06-19 PROCEDURE — 93000 ELECTROCARDIOGRAM COMPLETE: CPT | Performed by: INTERNAL MEDICINE

## 2025-06-19 PROCEDURE — 99215 OFFICE O/P EST HI 40 MIN: CPT | Performed by: INTERNAL MEDICINE

## 2025-06-19 PROCEDURE — 3008F BODY MASS INDEX DOCD: CPT | Performed by: INTERNAL MEDICINE

## 2025-06-19 RX ORDER — METOPROLOL SUCCINATE 25 MG/1
25 TABLET, EXTENDED RELEASE ORAL DAILY
Qty: 90 TABLET | Refills: 3 | Status: SHIPPED | OUTPATIENT
Start: 2025-06-19 | End: 2026-06-19

## 2025-06-19 RX ORDER — SPIRONOLACTONE 25 MG/1
25 TABLET ORAL DAILY
Qty: 90 TABLET | Refills: 3 | Status: SHIPPED | OUTPATIENT
Start: 2025-06-19 | End: 2026-06-19

## 2025-06-19 RX ORDER — TRAMADOL HYDROCHLORIDE 50 MG/1
50-100 TABLET, FILM COATED ORAL EVERY 8 HOURS PRN
COMMUNITY
Start: 2025-06-10

## 2025-06-19 NOTE — PATIENT INSTRUCTIONS
Please bring all medicines, vitamins, and herbal supplements with you when you come to the office.    Prescriptions will not be filled unless you are compliant with your follow up appointments or have a follow up appointment scheduled as per instruction of your physician. Refills should be requested at the time of your visit.     BMI was above normal measurement. Current weight: 93.4 kg (206 lb)  Weight change since last visit (-) denotes wt loss 12 lbs   Weight loss needed to achieve BMI 25: 1 Lbs  Weight loss needed to achieve BMI 30: -39.9 Lbs  Provided instructions on dietary changes  Provided instructions on exercise.    Monitor bp and hr at home on home cuff

## 2025-06-19 NOTE — LETTER
June 26, 2025     Chris Dubois MD  [address]  [place] [address]    Patient: Aníbal Gómez   YOB: 1955   Date of Visit: 6/19/2025       Dear Dr. Chris Dubois MD:    Thank you for referring Aníbal Gómez to me for evaluation. Below are my notes for this consultation.  If you have questions, please do not hesitate to call me. I look forward to following your patient along with you.       Sincerely,     Cruzito Ellison MD      CC: No Recipients  ______________________________________________________________________________________        CARDIOLOGY CONSULTATION NOTE       Patient:    Aníbal Gómez    YOB: 1955  MRN:    [id number]    Date:   6/19/2025      REASON FOR CONSULT / CHIEF COMPLAINT:      Cardiology consultation for atrial fibrillation and to establish cardiac care..    IMPRESSION:      Shortness of breath  Edema  Wheezing suggesting underlying respiratory tract infection.  Paroxysmal atrial fibrillation  Abnormal resting electrocardiogram  Hyperlipidemia  Hypertension  Hypothyroidism  Alcohol use history  Tobacco abuse history  Family history of cardiovascular disease  Otherwise as per assessment below.    RECOMMENDATIONS:      Patient has the above noted history of findings. He has paired his atrial fibrillation with EKG noting of atrial fibrillation currently. He is on no anticoagulation at this time. I would suggest Xarelto 20 mg daily, Toprol 25 mg daily 25 mg, Spironolactone 25 mg daily for Now.  He'll continue his other medications. Refills were provided.    His pulmonary exam has wheeze and a concern. He may have a coexistent respiratory tract infection. He will check with his primary care physician.    He was encouraged to discontinue smoking and limit his alcohol intake    Exercise dietary program was encouraged.     Hydration.    MyChart portal use was encouraged.    We will plan to see back following the above testing with Laboratory  Studies and ECG as noted.     Patient will follow up with their primary physician for general care.    The patient knows to contact medical care earlier if need be.      HPI:     Aníbal Gmóez was seen in cardiac evaluation at the [facility] Cardiology office June 19, 2025.      The patients problems are listed as in the impression above.    Electronic medical records reviewed.    Patient is a pleasant 69 year-old hypertensive, hyperlipidemia gentleman with history of a prior atrial fibrillation, whom has been reluctant to take dysrhythmic medications in the past. He has been followed by Dr. Diaz,. He's not been seen for quite some time. He presents for consultation given increase to symptomatology..    Patient has dyspnea on exertion predominantly.  Does have moderate edema.  Has no other cardiovascular complaints.    Patient denies Chest Pain, Lightheadedness, Dizziness, TIA or CVA symptoms.  No CHF.  No Palpitations.  No GI,  or Bleeding Issues. No Recent Fever or Chills.     Cardiovascular and general review of systems is otherwise negative.    A 14-system review is otherwise negative, other than noted.    ALLERGIES:     Patient has no known allergies.    MEDICATIONS:     Current Outpatient Medications   Medication Instructions   • acetaminophen (TYLENOL) 500 mg, Daily PRN   • levothyroxine (SYNTHROID) 200 mcg, oral, Daily before breakfast   • potassium chloride CR 10 mEq ER tablet 10 mEq, 2 times daily   • traMADol (ULTRAM)  mg, Every 8 hours PRN       PAST MEDICAL HISTORY:   As per impression above.  No other significant past medical or surgical history appreciated.    SOCIAL HISTORY:   Single.  Retired.  .  History of tobacco abuse.  Half a pack a day currently.  Alcohol use with 2 fireball's per day.  No illicit drug use.    FAMILY HISTORY:   Positive family history of CA and carotid artery disease    VITALS:     Vitals:    06/19/25 0917   BP: 144/86   Pulse: 99       Wt  Readings from Last 4 Encounters:   06/19/25 93.4 kg (206 lb)   06/17/24 88 kg (194 lb)   10/23/23 90.7 kg (200 lb)   08/08/23 94.3 kg (208 lb)       PHYSICAL EXAMINATION:      General: No acute distress.  Alert and oriented.  Head And Neck Examination: No jugular venous distention, no carotid bruits, no mass. Carotid upstrokes preserved. Oral mucosa moist.  No xanthelasma. Head and neck examination otherwise unremarkable.  Lungs: Clear to auscultation and percussion.  Positive wheezes and rhonchi.  Chest: Excursion appeared to be normal. No chest wall tenderness on palpation.  Heart: Normal S1 and S2. No S3. No S4. No rub. Grade 1/6 systolic murmur, best heard at the left sternal border. Point of maximal impulse was within normal limits.  Abdomen: Soft. Nontender. No organomegaly. No bruits. No masses. Obese.  Extremities: 2+ bipedal edema. No clubbing. No cyanosis.  Pulses are strong throughout. No bruits.  Musculoskeletal Exam: No ulcers, otherwise unremarkable.  Neuro: Neurologically appeared grossly intact.    ELECTROCARDIOGRAM:      atrial fibrillation, rate 99.  Poor wave anterior progression, question anterior septal MI.  Nonspecific ST wave changes.    CARDIAC TESTING:      None this visit    LABORATORY DATA:      4/2025:  Chem-7, CBC, liver function today is normal.    TSH 30.                PROBLEM LIST:     Problem List[1]          Cruzito Ellison MD, FACC   [facility] /  Cardiology      Of Note:  [employer] voice recognition dictation software was utilized partially in the preparation of this note, therefore, inaccuracies in spelling, word choice and punctuation may have occurred which were not recognized at the time of signing.    Patient was seen and examined with total time of visit including chart preparation, rooming, and chart completion exceeding 40 minutes.      Caroline OQUENDO LPN  am scribing for, and in the presence of Dr. Cruzito Ellison MD, FACC.    I, Dr. Cruzito Ellison MD, FACC,  personally performed the services described in the documentation as scribed by Caroline LUTZ LPN  in my presence, and confirm it is both accurate and complete.                [1]  Patient Active Problem List  Diagnosis   • Alcoholism (Multi)   • Allergic rhinitis   • Cigarette nicotine dependence without complication   • Hyperlipidemia   • GUSTAFSON (nonalcoholic steatohepatitis)   • Acquired hypothyroidism   • Paroxysmal atrial fibrillation (Multi)   • SOB (shortness of breath)   • BMI 25.0-25.9,adult   • Family history of coronary artery disease   • Essential (primary) hypertension   • History of cataract surgery   • History of shoulder surgery   • Wheezes   • Localized edema   • History of thyroidectomy

## 2025-06-19 NOTE — PROGRESS NOTES
CARDIOLOGY CONSULTATION NOTE       Patient:    Aníbal Gómez    YOB: 1955  MRN:    [id number]    Date:   6/19/2025      REASON FOR CONSULT / CHIEF COMPLAINT:      Cardiology consultation for atrial fibrillation and to establish cardiac care..    IMPRESSION:      Shortness of breath  Edema  Wheezing suggesting underlying respiratory tract infection.  Paroxysmal atrial fibrillation  Abnormal resting electrocardiogram  Hyperlipidemia  Hypertension  Hypothyroidism  Alcohol use history  Tobacco abuse history  Family history of cardiovascular disease  Otherwise as per assessment below.    RECOMMENDATIONS:      Patient has the above noted history of findings. He has paired his atrial fibrillation with EKG noting of atrial fibrillation currently. He is on no anticoagulation at this time. I would suggest Xarelto 20 mg daily, Toprol 25 mg daily 25 mg, Spironolactone 25 mg daily for Now.  He'll continue his other medications. Refills were provided.    His pulmonary exam has wheeze and a concern. He may have a coexistent respiratory tract infection. He will check with his primary care physician.    He was encouraged to discontinue smoking and limit his alcohol intake    Exercise dietary program was encouraged.     Hydration.    [employer] portal use was encouraged.    We will plan to see back following the above testing with Laboratory Studies and ECG as noted.     Patient will follow up with their primary physician for general care.    The patient knows to contact medical care earlier if need be.      HPI:     Aníbal Gómez was seen in cardiac evaluation at the [facility] Cardiology office June 19, 2025.      The patients problems are listed as in the impression above.    Electronic medical records reviewed.    Patient is a pleasant 69 year-old hypertensive, hyperlipidemia gentleman with history of a prior atrial fibrillation, whom has been reluctant to take dysrhythmic medications in the past. He has been  followed by Dr. Diaz,. He's not been seen for quite some time. He presents for consultation given increase to symptomatology..    Patient has dyspnea on exertion predominantly.  Does have moderate edema.  Has no other cardiovascular complaints.    Patient denies Chest Pain, Lightheadedness, Dizziness, TIA or CVA symptoms.  No CHF.  No Palpitations.  No GI,  or Bleeding Issues. No Recent Fever or Chills.     Cardiovascular and general review of systems is otherwise negative.    A 14-system review is otherwise negative, other than noted.    ALLERGIES:     Patient has no known allergies.    MEDICATIONS:     Current Outpatient Medications   Medication Instructions    acetaminophen (TYLENOL) 500 mg, Daily PRN    levothyroxine (SYNTHROID) 200 mcg, oral, Daily before breakfast    potassium chloride CR 10 mEq ER tablet 10 mEq, 2 times daily    traMADol (ULTRAM)  mg, Every 8 hours PRN       PAST MEDICAL HISTORY:   As per impression above.  No other significant past medical or surgical history appreciated.    SOCIAL HISTORY:   Single.  Retired.  .  History of tobacco abuse.  Half a pack a day currently.  Alcohol use with 2 fireball's per day.  No illicit drug use.    FAMILY HISTORY:   Positive family history of CA and carotid artery disease    VITALS:     Vitals:    06/19/25 0917   BP: 144/86   Pulse: 99       Wt Readings from Last 4 Encounters:   06/19/25 93.4 kg (206 lb)   06/17/24 88 kg (194 lb)   10/23/23 90.7 kg (200 lb)   08/08/23 94.3 kg (208 lb)       PHYSICAL EXAMINATION:      General: No acute distress.  Alert and oriented.  Head And Neck Examination: No jugular venous distention, no carotid bruits, no mass. Carotid upstrokes preserved. Oral mucosa moist.  No xanthelasma. Head and neck examination otherwise unremarkable.  Lungs: Clear to auscultation and percussion.  Positive wheezes and rhonchi.  Chest: Excursion appeared to be normal. No chest wall tenderness on palpation.  Heart:  Normal S1 and S2. No S3. No S4. No rub. Grade 1/6 systolic murmur, best heard at the left sternal border. Point of maximal impulse was within normal limits.  Abdomen: Soft. Nontender. No organomegaly. No bruits. No masses. Obese.  Extremities: 2+ bipedal edema. No clubbing. No cyanosis.  Pulses are strong throughout. No bruits.  Musculoskeletal Exam: No ulcers, otherwise unremarkable.  Neuro: Neurologically appeared grossly intact.    ELECTROCARDIOGRAM:      atrial fibrillation, rate 99.  Poor wave anterior progression, question anterior septal MI.  Nonspecific ST wave changes.    CARDIAC TESTING:      None this visit    LABORATORY DATA:      4/2025:  Chem-7, CBC, liver function today is normal.    TSH 30.                PROBLEM LIST:     Problem List[1]          Cruzito Ellison MD, FACC   [facility] /  Cardiology      Of Note:  [employer] voice recognition dictation software was utilized partially in the preparation of this note, therefore, inaccuracies in spelling, word choice and punctuation may have occurred which were not recognized at the time of signing.    Patient was seen and examined with total time of visit including chart preparation, rooming, and chart completion exceeding 40 minutes.      ICaroline LPN  am scribing for, and in the presence of Dr. Cruzito Ellison MD, FACC.    I, Dr. Cruzito Ellison MD, [facility], personally performed the services described in the documentation as scribed by Caroline LUTZ LPN  in my presence, and confirm it is both accurate and complete.              [1]   Patient Active Problem List  Diagnosis    Alcoholism (Multi)    Allergic rhinitis    Cigarette nicotine dependence without complication    Hyperlipidemia    GUSTAFSON (nonalcoholic steatohepatitis)    Acquired hypothyroidism    Paroxysmal atrial fibrillation (Multi)    SOB (shortness of breath)    BMI 25.0-25.9,adult    Family history of coronary artery disease    Essential (primary) hypertension    History of cataract  surgery    History of shoulder surgery    Wheezes    Localized edema    History of thyroidectomy

## 2025-06-23 DIAGNOSIS — I48.0 PAROXYSMAL ATRIAL FIBRILLATION (MULTI): ICD-10-CM

## 2025-06-23 NOTE — TELEPHONE ENCOUNTER
Pt phones and states that he was just prescribed xarelto. States he cannot afford med as it will cost him over $600 a month. Pt reports he could get pradaxa for $35 a month at [facility]. Please advise if pt can be prescribed pradaxa or if there is an alternative.

## 2025-06-24 RX ORDER — DABIGATRAN ETEXILATE 150 MG/1
150 CAPSULE ORAL 2 TIMES DAILY
Qty: 180 CAPSULE | Refills: 3 | Status: SHIPPED | OUTPATIENT
Start: 2025-06-24 | End: 2026-06-24

## 2025-06-24 NOTE — TELEPHONE ENCOUNTER
Phoned patient and advised it is okay to switch. He verbalized understanding.     Orders prepped and sent to Dr Ellison for signature

## 2025-07-10 ENCOUNTER — HOSPITAL ENCOUNTER (OUTPATIENT)
Dept: CARDIOLOGY | Facility: CLINIC | Age: 70
Discharge: HOME | End: 2025-07-10
Payer: MEDICARE

## 2025-07-10 ENCOUNTER — HOSPITAL ENCOUNTER (OUTPATIENT)
Dept: RADIOLOGY | Facility: CLINIC | Age: 70
Discharge: HOME | End: 2025-07-10
Payer: MEDICARE

## 2025-07-10 VITALS — HEART RATE: 99 BPM | DIASTOLIC BLOOD PRESSURE: 94 MMHG | SYSTOLIC BLOOD PRESSURE: 146 MMHG

## 2025-07-10 DIAGNOSIS — R94.31 ABNORMAL ELECTROCARDIOGRAM (ECG) (EKG): ICD-10-CM

## 2025-07-10 DIAGNOSIS — I48.0 PAROXYSMAL ATRIAL FIBRILLATION (MULTI): ICD-10-CM

## 2025-07-10 DIAGNOSIS — R06.02 SOB (SHORTNESS OF BREATH): ICD-10-CM

## 2025-07-10 DIAGNOSIS — R60.0 LOCALIZED EDEMA: ICD-10-CM

## 2025-07-10 PROCEDURE — 3430000001 HC RX 343 DIAGNOSTIC RADIOPHARMACEUTICALS: Performed by: INTERNAL MEDICINE

## 2025-07-10 PROCEDURE — 93017 CV STRESS TEST TRACING ONLY: CPT

## 2025-07-10 PROCEDURE — A9502 TC99M TETROFOSMIN: HCPCS | Performed by: INTERNAL MEDICINE

## 2025-07-10 PROCEDURE — 78452 HT MUSCLE IMAGE SPECT MULT: CPT

## 2025-07-10 PROCEDURE — 2500000004 HC RX 250 GENERAL PHARMACY W/ HCPCS (ALT 636 FOR OP/ED): Performed by: INTERNAL MEDICINE

## 2025-07-10 RX ORDER — REGADENOSON 0.08 MG/ML
0.4 INJECTION, SOLUTION INTRAVENOUS ONCE
Status: COMPLETED | OUTPATIENT
Start: 2025-07-10 | End: 2025-07-10

## 2025-07-10 RX ADMIN — TETROFOSMIN 9.5 MILLICURIE: 0.23 INJECTION, POWDER, LYOPHILIZED, FOR SOLUTION INTRAVENOUS at 09:27

## 2025-07-10 RX ADMIN — TETROFOSMIN 32.3 MILLICURIE: 0.23 INJECTION, POWDER, LYOPHILIZED, FOR SOLUTION INTRAVENOUS at 10:33

## 2025-07-10 RX ADMIN — REGADENOSON 0.4 MG: 0.08 INJECTION, SOLUTION INTRAVENOUS at 10:32

## 2025-08-26 ENCOUNTER — HOSPITAL ENCOUNTER (OUTPATIENT)
Dept: CARDIOLOGY | Facility: CLINIC | Age: 70
Discharge: HOME | End: 2025-08-26
Payer: MEDICARE

## 2025-08-26 VITALS
SYSTOLIC BLOOD PRESSURE: 168 MMHG | WEIGHT: 206 LBS | BODY MASS INDEX: 25.09 KG/M2 | HEIGHT: 76 IN | DIASTOLIC BLOOD PRESSURE: 86 MMHG

## 2025-08-26 DIAGNOSIS — I48.0 PAROXYSMAL ATRIAL FIBRILLATION (MULTI): ICD-10-CM

## 2025-08-26 DIAGNOSIS — R06.02 SOB (SHORTNESS OF BREATH): ICD-10-CM

## 2025-08-26 DIAGNOSIS — R60.0 LOCALIZED EDEMA: ICD-10-CM

## 2025-08-26 DIAGNOSIS — I10 ESSENTIAL (PRIMARY) HYPERTENSION: ICD-10-CM

## 2025-08-26 DIAGNOSIS — R94.31 ABNORMAL ELECTROCARDIOGRAM (ECG) (EKG): ICD-10-CM

## 2025-08-26 PROCEDURE — 93306 TTE W/DOPPLER COMPLETE: CPT

## 2025-08-26 PROCEDURE — 93306 TTE W/DOPPLER COMPLETE: CPT | Performed by: INTERNAL MEDICINE

## 2025-08-29 LAB
AORTIC VALVE MEAN GRADIENT: 6 MMHG
AORTIC VALVE PEAK VELOCITY: 1.76 M/S
AV PEAK GRADIENT: 12 MMHG
AVA (PEAK VEL): 1.98 CM2
AVA (VTI): 2.4 CM2
EJECTION FRACTION APICAL 4 CHAMBER: 57.4
EJECTION FRACTION: 65 %
LEFT ATRIUM VOLUME AREA LENGTH INDEX BSA: 43.9 ML/M2
LEFT VENTRICLE INTERNAL DIMENSION DIASTOLE: 4.46 CM (ref 3.5–6)
LEFT VENTRICULAR OUTFLOW TRACT DIAMETER: 2.1 CM
LV EJECTION FRACTION BIPLANE: 63 %
RIGHT VENTRICLE PEAK SYSTOLIC PRESSURE: 30 MMHG
TRICUSPID ANNULAR PLANE SYSTOLIC EXCURSION: 2 CM

## 2025-09-25 ENCOUNTER — APPOINTMENT (OUTPATIENT)
Dept: CARDIOLOGY | Facility: CLINIC | Age: 70
End: 2025-09-25
Payer: MEDICARE